# Patient Record
Sex: MALE | Race: WHITE | NOT HISPANIC OR LATINO
[De-identification: names, ages, dates, MRNs, and addresses within clinical notes are randomized per-mention and may not be internally consistent; named-entity substitution may affect disease eponyms.]

---

## 2020-08-10 PROBLEM — Z00.00 ENCOUNTER FOR PREVENTIVE HEALTH EXAMINATION: Status: ACTIVE | Noted: 2020-08-10

## 2020-08-13 ENCOUNTER — APPOINTMENT (OUTPATIENT)
Dept: ORTHOPEDIC SURGERY | Facility: CLINIC | Age: 50
End: 2020-08-13
Payer: COMMERCIAL

## 2020-08-13 VITALS
HEIGHT: 69 IN | TEMPERATURE: 97.6 F | OXYGEN SATURATION: 98 % | BODY MASS INDEX: 23.7 KG/M2 | HEART RATE: 70 BPM | WEIGHT: 160 LBS | DIASTOLIC BLOOD PRESSURE: 70 MMHG | SYSTOLIC BLOOD PRESSURE: 110 MMHG

## 2020-08-13 DIAGNOSIS — Z72.3 LACK OF PHYSICAL EXERCISE: ICD-10-CM

## 2020-08-13 PROCEDURE — 99203 OFFICE O/P NEW LOW 30 MIN: CPT

## 2020-08-14 NOTE — PHYSICAL EXAM
[de-identified] : Mri of the right knee shows an obliqjue tear in the posterior horn of the medial meniscus  there is an extensive subchondral bone marrow edema in the medial femoral condyle  [de-identified] : The patient is a well developed, well nourished male in no apparent distress. He is alert and oriented X 3 with a pleasant mood and appropriate affect.\par \par On physical examination of the right knee, his Rom is 0-120 degrees. The patient walks with an antalgic gait and stands in neutral alignment. There is 1+ effusion. No warmth or erythema is noted. The patella is non tender to palpation medially or laterally. There is no crepitus noted. The apprehension and grind tests are negative. The extensor mechanism is intact. There is medial joint line tenderness. The Sri sign is positive. The Lachman and pivot shift tests are negative. There is no varus or valgus laxity at 0 or 30 degrees. No posterolateral or anteromedial laxity is noted. No masses are palpable. No other soft tissue or bony tenderness is noted. Quadriceps weakness is noted. Neurovascular function is intact.

## 2020-08-14 NOTE — END OF VISIT
[FreeTextEntry3] : All medical record entries made by CHIP Wheat, acting as a scribe for this encounter under the direction of Herber Wilcox MD . I have reviewed the chart and agree that the record accurately reflects my personal performance of the history, physical exam, assessment and plan. I have also personally directed, reviewed, and agreed with the chart.

## 2020-08-14 NOTE — HISTORY OF PRESENT ILLNESS
[de-identified] : Darwin Avery is a 49 yo gentleman with right knee issues for the last month. He started doing high intensity home workouts with lots of lunges and squats. He developed progressive medial knee pain and swelling. He had difficulty with full weight bearing. He saw a local orthopedist and had an MRI. He was told he had a medial meniscus tear. He has not had an formal treatment as of yet. he denies any previous knee issues. \par \par \par

## 2020-08-14 NOTE — DISCUSSION/SUMMARY
[de-identified] : Mr Avery has a symptomatic medial insufficiency fracture. He will begin a course of Calcitonin nasal spray. he will be sent for a vitamin D level and an SMA 20. He will obtain a medial  brace for support. He will return in six weeks for reevaluation. He will call if any issues arise.

## 2020-09-22 ENCOUNTER — APPOINTMENT (OUTPATIENT)
Dept: ORTHOPEDIC SURGERY | Facility: CLINIC | Age: 50
End: 2020-09-22
Payer: COMMERCIAL

## 2020-09-22 VITALS
TEMPERATURE: 97.8 F | DIASTOLIC BLOOD PRESSURE: 80 MMHG | HEIGHT: 69 IN | SYSTOLIC BLOOD PRESSURE: 110 MMHG | WEIGHT: 160 LBS | BODY MASS INDEX: 23.7 KG/M2

## 2020-09-22 PROCEDURE — 99213 OFFICE O/P EST LOW 20 MIN: CPT

## 2020-09-23 NOTE — PHYSICAL EXAM
[de-identified] : The patient is a well developed, well nourished male in no apparent distress. He is alert and oriented X 3 with a pleasant mood and appropriate affect.\par \par On physical examination of the right knee, his Rom is 0-125degrees. The patient walks with an antalgic gait and stands in neutral alignment. There is  trace effusion. No warmth or erythema is noted. The patella is non tender to palpation medially or laterally. There is no crepitus noted. The apprehension and grind tests are negative. The extensor mechanism is intact. There is no  joint line tenderness. The Sri sign is negative.  The Lachman and pivot shift tests are negative. There is no varus or valgus laxity at 0 or 30 degrees. No posterolateral or anteromedial laxity is noted. No masses are palpable. No other soft tissue or bony tenderness is noted. Quadriceps weakness is noted. Neurovascular function is intact.

## 2020-09-23 NOTE — HISTORY OF PRESENT ILLNESS
[de-identified] : Darwin returns today for evaluation of his right knee. He reports significant improvement with Calcitonin and Calcium supplementation to treat his insufficiency fracture. He reports a decrease in pain and swelling. His ROm has improved. He is able to walk without pain. He denies any locking or buckling,

## 2020-09-23 NOTE — DISCUSSION/SUMMARY
[de-identified] : Darwin is responding well to the current treatment program for his medial insufficiency fracture. He will complete two more weeks of Calcitonin to finish the course. He will continue on with the Calcium and vitamin D supplementation. he will return in one month for reevaluation. He will call if any issues arise

## 2020-10-22 ENCOUNTER — APPOINTMENT (OUTPATIENT)
Dept: ORTHOPEDIC SURGERY | Facility: CLINIC | Age: 50
End: 2020-10-22
Payer: COMMERCIAL

## 2020-10-22 VITALS
TEMPERATURE: 97.6 F | WEIGHT: 160 LBS | SYSTOLIC BLOOD PRESSURE: 120 MMHG | DIASTOLIC BLOOD PRESSURE: 80 MMHG | HEIGHT: 69 IN | HEART RATE: 68 BPM | BODY MASS INDEX: 23.7 KG/M2 | OXYGEN SATURATION: 97 %

## 2020-10-22 DIAGNOSIS — M84.453A PATHOLOGICAL FRACTURE, UNSPECIFIED FEMUR, INITIAL ENCOUNTER FOR FRACTURE: ICD-10-CM

## 2020-10-22 PROCEDURE — 99072 ADDL SUPL MATRL&STAF TM PHE: CPT

## 2020-10-22 PROCEDURE — 99213 OFFICE O/P EST LOW 20 MIN: CPT

## 2020-10-27 PROBLEM — M84.453A INSUFFICIENCY FRACTURE OF MEDIAL FEMORAL CONDYLE: Status: ACTIVE | Noted: 2020-08-13

## 2020-10-27 NOTE — PHYSICAL EXAM
[de-identified] : The patient is a well developed, well nourished male in no apparent distress. He is alert and oriented X 3 with a pleasant mood and appropriate affect.\par \par On physical examination of the right knee, his Rom is 0-125degrees. The patient walks with an antalgic gait and stands in neutral alignment. There is  trace effusion. No warmth or erythema is noted. The patella is non tender to palpation medially or laterally. There is no crepitus noted. The apprehension and grind tests are negative. The extensor mechanism is intact. There is no  joint line tenderness. The Sri sign is negative.  The Lachman and pivot shift tests are negative. There is no varus or valgus laxity at 0 or 30 degrees. No posterolateral or anteromedial laxity is noted. No masses are palpable. No other soft tissue or bony tenderness is noted. Quadriceps weakness is noted. Neurovascular function is intact.

## 2020-10-27 NOTE — HISTORY OF PRESENT ILLNESS
[de-identified] : Darwin returns today for evaluation of his right knee. He has an insufficiency fracture. He reports improvement after completing his course of Calcitonin. He does have ongoing medial knee discomfort and stiffness. he has persistent swelling. he denies any locking or buckling.

## 2020-10-27 NOTE — DISCUSSION/SUMMARY
[de-identified] : Darwin has some persistent medial knee pain and ongoing swelling despite completing a course of Calcitonin. He will be sent for a repeat MRI to r.o bony collapse. We will call him with the results. he will continue to rest and ice his knee. All questions were answered. He will call if any issues arise.

## 2021-02-12 LAB
24R-OH-CALCIDIOL SERPL-MCNC: 43.3 PG/ML
ALBUMIN SERPL ELPH-MCNC: 4.8 G/DL
ALP BLD-CCNC: 103 U/L
ALT SERPL-CCNC: 14 U/L
ANION GAP SERPL CALC-SCNC: 15 MMOL/L
AST SERPL-CCNC: 19 U/L
BILIRUB SERPL-MCNC: 0.7 MG/DL
BUN SERPL-MCNC: 23 MG/DL
CALCIUM SERPL-MCNC: 10.1 MG/DL
CHLORIDE SERPL-SCNC: 102 MMOL/L
CO2 SERPL-SCNC: 26 MMOL/L
CREAT SERPL-MCNC: 0.83 MG/DL
GLUCOSE SERPL-MCNC: 100 MG/DL
POTASSIUM SERPL-SCNC: 4.7 MMOL/L
PROT SERPL-MCNC: 7.6 G/DL
SODIUM SERPL-SCNC: 143 MMOL/L

## 2021-08-05 ENCOUNTER — APPOINTMENT (OUTPATIENT)
Dept: UROLOGY | Facility: CLINIC | Age: 51
End: 2021-08-05
Payer: COMMERCIAL

## 2021-08-05 VITALS
WEIGHT: 182 LBS | DIASTOLIC BLOOD PRESSURE: 61 MMHG | BODY MASS INDEX: 26.96 KG/M2 | HEART RATE: 58 BPM | SYSTOLIC BLOOD PRESSURE: 113 MMHG | HEIGHT: 69 IN

## 2021-08-05 DIAGNOSIS — Z80.51 FAMILY HISTORY OF MALIGNANT NEOPLASM OF KIDNEY: ICD-10-CM

## 2021-08-05 DIAGNOSIS — Z78.9 OTHER SPECIFIED HEALTH STATUS: ICD-10-CM

## 2021-08-05 DIAGNOSIS — F17.200 NICOTINE DEPENDENCE, UNSPECIFIED, UNCOMPLICATED: ICD-10-CM

## 2021-08-05 PROCEDURE — 99204 OFFICE O/P NEW MOD 45 MIN: CPT

## 2021-08-05 RX ORDER — CALCITONIN SALMON 200 [IU]/1
200 SPRAY, METERED NASAL DAILY
Qty: 2 | Refills: 0 | Status: COMPLETED | COMMUNITY
Start: 2020-08-13 | End: 2021-08-05

## 2021-08-05 NOTE — ASSESSMENT
[FreeTextEntry1] : By physical exam and some additional history with respect to children my gestalt is I saw him for fertility issues felt that he had Klinefelter's and told him that at some point in time his testosterone levels will drop and he will need to get therapy where he will will have issues of hypogonadism.  The MRI in 2020 showed osteopenia his blood test reportedly showed low testosterone and the question is where to go from here\par \par I am going to send him for repeat hormone testing is even if he had 1 test that was abnormal we will going to need at least 2 to start him on therapy.  I am not going to repeat the chromosome analysis is whether writer will get my memory if not relevant to future therapy.\par \par Once we get the blood test he will get me the copy from his doctor 3 months ago we will have our tests and we will go over them.  He is planning on going to his physician to get the blood drawn I cautioned him to make sure they draw the tubes and use the lab numbers that we use as most doctors do not draw the subspecialty tenderness

## 2021-08-05 NOTE — PHYSICAL EXAM
[General Appearance - Well Developed] : well developed [General Appearance - Well Nourished] : well nourished [Normal Appearance] : normal appearance [Well Groomed] : well groomed [General Appearance - In No Acute Distress] : no acute distress [Heart Rate And Rhythm] : Heart rate and rhythm were normal [Edema] : no peripheral edema [Respiration, Rhythm And Depth] : normal respiratory rhythm and effort [Exaggerated Use Of Accessory Muscles For Inspiration] : no accessory muscle use [Auscultation Breath Sounds / Voice Sounds] : lungs were clear to auscultation bilaterally [Abdomen Soft] : soft [Abdomen Tenderness] : non-tender [Abdomen Hernia] : no hernia was discovered [Costovertebral Angle Tenderness] : no ~M costovertebral angle tenderness [Urethral Meatus] : meatus normal [Penis Abnormality] : normal circumcised penis [Normal Station and Gait] : the gait and station were normal for the patient's age [] : no rash [FreeTextEntry1] : Deep tendon reflexes are normal [Oriented To Time, Place, And Person] : oriented to person, place, and time [Affect] : the affect was normal [Mood] : the mood was normal [Not Anxious] : not anxious

## 2021-08-05 NOTE — HISTORY OF PRESENT ILLNESS
[FreeTextEntry1] : Darwin is a 51-year-old male born July 16, 1970 who tells me that I saw him 15 years ago was so told by the time he is age 50 he will need to come back as he will he will have some abnormalities that if not taken care of could kill him.  I do not have records from 15 years ago I do not recollect what he is talking about and I cannot recall ever telling someone that in 15 years unless they see me they will die.  There may have been some misunderstanding his native tongue is febrile though I am fairly fluent and it there could have been a contact gap.\par \par Regardless he tells me currently he has no trouble with urination he has normal desire in order to have relations with his wife he needs to take 40 mg of Viagra but with the Viagra on board he is doing well enough.  He has a normal libido if he sees a pretty woman in the street he is attracted though obviously being an Judaism Taoist he is not going to take advantage of his feelings.  He has no other urologic issues no trouble urinating nocturia dysuria no history of blood no kidney stones etc.\par \par He tells me that his PCP chaparro some blood about 3 months ago and showed a low testosterone it just of time until he could get in here he does not have the records but he knows to get them for me.

## 2021-08-05 NOTE — LETTER BODY
[Dear  ___] : Dear  [unfilled], [FreeTextEntry2] : Patient requested that no note be sent to his primary care doctor as some issues are simply his he does not want them shared with anyone the healthcare providers family children etc.

## 2021-08-05 NOTE — LETTER HEADER
[FreeTextEntry3] : Maria Dolores Francisco M.D.\par Director of Urology\par [facility]\par [address] [place] [address], [address]\[place] [address]

## 2021-08-12 ENCOUNTER — APPOINTMENT (OUTPATIENT)
Dept: UROLOGY | Facility: CLINIC | Age: 51
End: 2021-08-12
Payer: COMMERCIAL

## 2021-08-12 ENCOUNTER — LABORATORY RESULT (OUTPATIENT)
Age: 51
End: 2021-08-12

## 2021-08-12 VITALS
BODY MASS INDEX: 26.66 KG/M2 | TEMPERATURE: 98 F | WEIGHT: 180 LBS | HEART RATE: 58 BPM | HEIGHT: 69 IN | SYSTOLIC BLOOD PRESSURE: 122 MMHG | DIASTOLIC BLOOD PRESSURE: 63 MMHG

## 2021-08-12 DIAGNOSIS — N46.01 ORGANIC AZOOSPERMIA: ICD-10-CM

## 2021-08-12 PROCEDURE — 99214 OFFICE O/P EST MOD 30 MIN: CPT

## 2021-08-12 NOTE — PHYSICAL EXAM
[General Appearance - Well Developed] : well developed [General Appearance - Well Nourished] : well nourished [Normal Appearance] : normal appearance [Well Groomed] : well groomed [General Appearance - In No Acute Distress] : no acute distress [] : no respiratory distress [Respiration, Rhythm And Depth] : normal respiratory rhythm and effort [Exaggerated Use Of Accessory Muscles For Inspiration] : no accessory muscle use [Auscultation Breath Sounds / Voice Sounds] : lungs were clear to auscultation bilaterally [Oriented To Time, Place, And Person] : oriented to person, place, and time [Affect] : the affect was normal [Mood] : the mood was normal [Not Anxious] : not anxious [Normal Station and Gait] : the gait and station were normal for the patient's age

## 2021-08-12 NOTE — LETTER HEADER
[FreeTextEntry3] : Mraia Dolores Francisco M.D.\par Director of Urology\par [facility]\par [address] [place] [address], [address]\[place] [address]

## 2021-08-12 NOTE — ASSESSMENT
[FreeTextEntry1] : We had a long talk discussing the pathophysiology of the interaction between the pituitary and the testicles.\par \par 1, he would like to know if in retrospect he could have had children he does not recall ever having had genetic testing and would like to know\par 2, we went over the various forms of testosterone replacement including\par          Gel be it on the shoulders, the armpit or the thighs\par          Injections\par                 Testosterone cypionate into the muscle which is inexpensive but uncomfortable\par                  Xyosted into the fat of the abdominal wall which is once per week but expensive\par         Subcutaneous pellets AKA Testopel\par \par \par We discussed the risk benefits of all especially the risk of transference if he goes to [facility] we will start with gel.  He will get blood drawn in 3 weeks and see me in a month.\par \par As far as his erections we will see if they improve now that his testosterone hopefully normalizes if not we will treat that separately

## 2021-08-12 NOTE — HISTORY OF PRESENT ILLNESS
[FreeTextEntry1] : Darwin is a 51-year-old male born July 16, 1970 seen on August 5, 2021 for subspecialty opinion.  He reportedly has low testosterone has had this issue for years does not know if he ever had chromosome studies.  I sent him for set of labs telling him to be specifically accurate and what he gets he had labs done by his doctor they got most but not all of what we wanted and he is here for review.\par \par Please note he does not have any recollection of the test for osteopenia or osteoporosis though he did have an MRI in September 2020 that was consistent with 1 or the other [Currently Experiencing ___] :  [unfilled] [Erectile Dysfunction] : Erectile Dysfunction

## 2021-08-16 RX ORDER — TESTOSTERONE 16.2 MG/G
20.25 MG/ACT GEL TRANSDERMAL
Qty: 1 | Refills: 0 | Status: DISCONTINUED | COMMUNITY
Start: 2021-08-12 | End: 2021-08-16

## 2021-09-09 ENCOUNTER — OUTPATIENT (OUTPATIENT)
Dept: OUTPATIENT SERVICES | Facility: HOSPITAL | Age: 51
LOS: 1 days | Discharge: HOME | End: 2021-09-09

## 2021-09-09 ENCOUNTER — RESULT REVIEW (OUTPATIENT)
Age: 51
End: 2021-09-09

## 2021-09-09 LAB
ALBUMIN SERPL ELPH-MCNC: 4.8 G/DL
ALP BLD-CCNC: 95 U/L
ALT SERPL-CCNC: 26 U/L
AST SERPL-CCNC: 23 U/L
BASOPHILS # BLD AUTO: 0.04 K/UL
BASOPHILS NFR BLD AUTO: 0.5 %
BILIRUB DIRECT SERPL-MCNC: <0.2 MG/DL
BILIRUB INDIRECT SERPL-MCNC: >0.5 MG/DL
BILIRUB SERPL-MCNC: 0.7 MG/DL
EOSINOPHIL # BLD AUTO: 0.16 K/UL
EOSINOPHIL NFR BLD AUTO: 2 %
HCT VFR BLD CALC: 46.2 %
HGB BLD-MCNC: 15.1 G/DL
IMM GRANULOCYTES NFR BLD AUTO: 0.4 %
LYMPHOCYTES # BLD AUTO: 1.8 K/UL
LYMPHOCYTES NFR BLD AUTO: 22.1 %
MAN DIFF?: NORMAL
MCHC RBC-ENTMCNC: 31 PG
MCHC RBC-ENTMCNC: 32.7 G/DL
MCV RBC AUTO: 94.9 FL
MONOCYTES # BLD AUTO: 0.74 K/UL
MONOCYTES NFR BLD AUTO: 9.1 %
NEUTROPHILS # BLD AUTO: 5.36 K/UL
NEUTROPHILS NFR BLD AUTO: 65.9 %
PLATELET # BLD AUTO: 226 K/UL
PROT SERPL-MCNC: 7.2 G/DL
RBC # BLD: 4.87 M/UL
RBC # FLD: 13.2 %
WBC # FLD AUTO: 8.13 K/UL

## 2021-09-10 DIAGNOSIS — Z13.820 ENCOUNTER FOR SCREENING FOR OSTEOPOROSIS: ICD-10-CM

## 2021-09-10 DIAGNOSIS — M81.0 AGE-RELATED OSTEOPOROSIS WITHOUT CURRENT PATHOLOGICAL FRACTURE: ICD-10-CM

## 2021-09-10 LAB — ESTRADIOL SERPL-MCNC: 19 PG/ML

## 2021-09-13 LAB
TESTOST BND SERPL-MCNC: 3.6 PG/ML
TESTOSTERONE TOTAL S: 207 NG/DL

## 2021-09-14 LAB — SHBG SERPL-SCNC: 25.2 NMOL/L

## 2021-09-17 LAB
TESTOSTERONE FREE/WEAKLY BND: 34.6 NG/DL
TESTOSTERONE TOTAL S: 188 NG/DL
TESTOSTERONE, % FREE/WEAKLY BND: 18.4 %

## 2021-10-01 ENCOUNTER — APPOINTMENT (OUTPATIENT)
Dept: UROLOGY | Facility: CLINIC | Age: 51
End: 2021-10-01
Payer: COMMERCIAL

## 2021-10-01 VITALS
HEIGHT: 69 IN | WEIGHT: 185 LBS | SYSTOLIC BLOOD PRESSURE: 132 MMHG | DIASTOLIC BLOOD PRESSURE: 81 MMHG | HEART RATE: 61 BPM | BODY MASS INDEX: 27.4 KG/M2

## 2021-10-01 PROCEDURE — 99214 OFFICE O/P EST MOD 30 MIN: CPT

## 2021-10-01 RX ORDER — TESTOSTERONE 50 MG/5G
50 MG/5GM GEL TRANSDERMAL
Qty: 30 | Refills: 0 | Status: COMPLETED | COMMUNITY
Start: 2021-08-16 | End: 2021-10-01

## 2021-10-01 NOTE — PHYSICAL EXAM
[General Appearance - Well Developed] : well developed [General Appearance - Well Nourished] : well nourished [Normal Appearance] : normal appearance [Well Groomed] : well groomed [General Appearance - In No Acute Distress] : no acute distress [Abdomen Soft] : soft [Abdomen Tenderness] : non-tender [Costovertebral Angle Tenderness] : no ~M costovertebral angle tenderness [Edema] : no peripheral edema [Heart Rate And Rhythm] : Heart rate and rhythm were normal [] : no respiratory distress [Respiration, Rhythm And Depth] : normal respiratory rhythm and effort [Exaggerated Use Of Accessory Muscles For Inspiration] : no accessory muscle use [Auscultation Breath Sounds / Voice Sounds] : lungs were clear to auscultation bilaterally [Oriented To Time, Place, And Person] : oriented to person, place, and time [Affect] : the affect was normal [Mood] : the mood was normal [Not Anxious] : not anxious [Normal Station and Gait] : the gait and station were normal for the patient's age

## 2021-10-01 NOTE — HISTORY OF PRESENT ILLNESS
[Currently Experiencing ___] :  [unfilled] [Erectile Dysfunction] : Erectile Dysfunction [FreeTextEntry1] : Darwin is a 51-year-old male last seen on August 12, 2021.  His testosterone levels were extremely low we sent him for chromosome analysis as it had never been done started him on AndroGel 1.62% 2 pumps per day he had repeat bloods drawn on September 9 and is here for follow-up review.  He tells me as soon as he started the testosterone the erections came back.  It works but not reliably without Viagra, now with Viagra he thinks it is 2 of the 20 mg tablets he feels like he is a teenager again.  He questions if he needs a higher dose of testosterone.\par \par Given his prior history of osteoporosis and the severe low testosterone level we also sent him for a bone scan.

## 2021-10-01 NOTE — ASSESSMENT
[FreeTextEntry1] : There are several issues here.  #1 though the testosterone is up it is still suboptimal with the bioavailable just below normal.  It is almost 7 times what it was without the medication but he needs more.\par \par We discussed the various options including doubling up the gel, switching to other modalities such as Xyosted or testosterone cypionate and eventually Testopel if he so desires.  For now as it is going to take time to get the Xyosted authorized and just can give him a double dose of AndroGel.  If the staff can get Xyosted authorized we will switch to the Xyosted he will get blood in about 6 weeks and see me in 8.  As we raise the testosterone level he may no longer need the Viagra but if he does as long as it is working that is good enough\par \par The next issue is the osteopenia.  Still a very high fracture risk is a young active man and I am recommending he contact his primary care doctor and get to an endocrinologist soon as possible.  Though over time the testosterone replacement may help his bones at this point he will probably need a bisphosphonate to help control it as well.

## 2021-10-01 NOTE — LETTER HEADER
[FreeTextEntry3] : Maria Dolores Francisco M.D.\par Director of Urology\par [facility]\par [address] [place] [address], [address]\[place] [address]

## 2021-10-18 NOTE — REVIEW OF SYSTEMS
[Joint Pain] : no joint pain [Joint Stiffness] : joint stiffness [Joint Swelling] : joint swelling [Negative] : Heme/Lymph You can access the FollowMyHealth Patient Portal offered by [facility] by registering at the following website: [email or web address]. By joining [employer]’s FollowMyHealth portal, you will also be able to view your health information using other applications (apps) compatible with our system.

## 2021-10-25 LAB
ALBUMIN SERPL ELPH-MCNC: 4.8 G/DL
ALP BLD-CCNC: 93 U/L
ALT SERPL-CCNC: 29 U/L
AST SERPL-CCNC: 28 U/L
BASOPHILS # BLD AUTO: 0.03 K/UL
BASOPHILS NFR BLD AUTO: 0.4 %
BILIRUB DIRECT SERPL-MCNC: 0.2 MG/DL
BILIRUB INDIRECT SERPL-MCNC: 0.8 MG/DL
BILIRUB SERPL-MCNC: 1 MG/DL
EOSINOPHIL # BLD AUTO: 0.17 K/UL
EOSINOPHIL NFR BLD AUTO: 2.5 %
ESTRADIOL SERPL-MCNC: 24 PG/ML
HCT VFR BLD CALC: 49 %
HGB BLD-MCNC: 15.7 G/DL
IMM GRANULOCYTES NFR BLD AUTO: 0.3 %
LYMPHOCYTES # BLD AUTO: 1.45 K/UL
LYMPHOCYTES NFR BLD AUTO: 21.2 %
MAN DIFF?: NORMAL
MCHC RBC-ENTMCNC: 30.5 PG
MCHC RBC-ENTMCNC: 32 G/DL
MCV RBC AUTO: 95.3 FL
MONOCYTES # BLD AUTO: 0.69 K/UL
MONOCYTES NFR BLD AUTO: 10.1 %
NEUTROPHILS # BLD AUTO: 4.49 K/UL
NEUTROPHILS NFR BLD AUTO: 65.5 %
PLATELET # BLD AUTO: 250 K/UL
PROT SERPL-MCNC: 7.3 G/DL
RBC # BLD: 5.14 M/UL
RBC # FLD: 13 %
TESTOST BND SERPL-MCNC: 7.5 PG/ML
TESTOSTERONE TOTAL S: 417 NG/DL
WBC # FLD AUTO: 6.85 K/UL

## 2021-10-26 LAB
SHBG SERPL-SCNC: 25.9 NMOL/L
TESTOSTERONE FREE/WEAKLY BND: 99.5 NG/DL
TESTOSTERONE TOTAL S: 510 NG/DL
TESTOSTERONE, % FREE/WEAKLY BND: 19.5 %

## 2021-10-29 ENCOUNTER — APPOINTMENT (OUTPATIENT)
Dept: UROLOGY | Facility: CLINIC | Age: 51
End: 2021-10-29
Payer: COMMERCIAL

## 2021-10-29 VITALS
WEIGHT: 180 LBS | HEART RATE: 60 BPM | HEIGHT: 69 IN | SYSTOLIC BLOOD PRESSURE: 131 MMHG | DIASTOLIC BLOOD PRESSURE: 77 MMHG | BODY MASS INDEX: 26.66 KG/M2

## 2021-10-29 PROCEDURE — 99214 OFFICE O/P EST MOD 30 MIN: CPT

## 2021-10-29 RX ORDER — SILDENAFIL 100 MG/1
100 TABLET, FILM COATED ORAL
Qty: 10 | Refills: 1 | Status: ACTIVE | OUTPATIENT
Start: 2021-10-29

## 2021-10-29 RX ORDER — TESTOSTERONE 20.25 MG/1.25G
20.25 MG/1.25GM GEL TOPICAL
Qty: 1 | Refills: 0 | Status: COMPLETED | COMMUNITY
Start: 2021-09-14 | End: 2021-10-29

## 2021-10-29 NOTE — HISTORY OF PRESENT ILLNESS
[FreeTextEntry1] : Darwin is a 51-year-old male born July 16, 1970.  We last saw him October 1, 2021 he has low testosterone secondary to Klinefelter's and we now have him on AndroGel 1.62% with 4 pumps per day.  He tells me he is feeling fine except that though he can now get rigid enough to attempt sex as soon as he gets close to the vagina the penis drops down and he can have satisfactory sex.  He wants to know what else we can do.\par \par Please note he tried Viagra in the past however at the time he had no testosterone on board and the question is will work better now that he has a normal testosterone level.\par \par Additionally he finds putting the gel on every morning and wait for it to dry is really quite irritating.  He wants to know if he would be a good candidate for Testopel.\par \par \par \par As far as his osteopenia his PCP referred him to Dr. Jim Gross an excellent endocrinologist who deals with osteopenia was based in [place]. [Erectile Dysfunction] : Erectile Dysfunction

## 2021-10-29 NOTE — LETTER HEADER
[FreeTextEntry3] : Maria Dolores Francisco M.D.\par Director of Urology\par [facility]\par [address] [place] [address], [address]\[place] [address]

## 2021-10-29 NOTE — PHYSICAL EXAM
[General Appearance - Well Developed] : well developed [General Appearance - Well Nourished] : well nourished [Normal Appearance] : normal appearance [Well Groomed] : well groomed [General Appearance - In No Acute Distress] : no acute distress [Abdomen Soft] : soft [Abdomen Tenderness] : non-tender [Costovertebral Angle Tenderness] : no ~M costovertebral angle tenderness [Heart Rate And Rhythm] : Heart rate and rhythm were normal [Edema] : no peripheral edema [] : no respiratory distress [Respiration, Rhythm And Depth] : normal respiratory rhythm and effort [Exaggerated Use Of Accessory Muscles For Inspiration] : no accessory muscle use [Auscultation Breath Sounds / Voice Sounds] : lungs were clear to auscultation bilaterally [Oriented To Time, Place, And Person] : oriented to person, place, and time [Affect] : the affect was normal [Not Anxious] : not anxious [Mood] : the mood was normal [Normal Station and Gait] : the gait and station were normal for the patient's age [No Focal Deficits] : no focal deficits

## 2021-10-29 NOTE — ASSESSMENT
[FreeTextEntry1] : With respect to the testosterone he is doing well on the gels but is quite irritating.  We will see if Testopel can be approved and if not he will have to decide if he wants to self-pay.  For now I will renew the AndroGel and wait authorization on the Testopel\par \par As far as his erectile dysfunction I reviewed the difference between cyclic GMP and cyclic AMP why the PDE 5 inhibitors working some but not at all, wife some work in some and some work and others including the time to onset including with and without food if it Cialis versus Viagra and the duration of activity with Viagra going to work quicker but getting out of her system quicker Cialis taking a little bit to get to work see you take it with normal relief time but you are potentiation window will be higher.  We also discussed the potential more serious side effects of nonischemic arterial optic neuropathy and of the ringing in the years which can be permanent\par \par He has taken Viagra at 100 mg dose without side effects in the past so we will try it again.  If that does not work we will try Cialis and if that does not work we discussed a Doppler study to see if injections which works on the cyclic AMP system as well as by blocking the nerves of detumescence we will use Trimix, is for him.

## 2021-11-08 ENCOUNTER — APPOINTMENT (OUTPATIENT)
Dept: UROLOGY | Facility: CLINIC | Age: 51
End: 2021-11-08
Payer: COMMERCIAL

## 2021-11-08 VITALS
WEIGHT: 187 LBS | DIASTOLIC BLOOD PRESSURE: 80 MMHG | HEART RATE: 56 BPM | BODY MASS INDEX: 27.7 KG/M2 | HEIGHT: 69 IN | SYSTOLIC BLOOD PRESSURE: 134 MMHG

## 2021-11-08 PROCEDURE — 99214 OFFICE O/P EST MOD 30 MIN: CPT

## 2021-11-08 NOTE — LETTER HEADER
[FreeTextEntry3] : Maria Dolores Francisco M.D.\par Director of Urology\par [facility]\par [address] [place] [address], [address]\[place] [address]

## 2021-11-08 NOTE — ASSESSMENT
[FreeTextEntry1] : try to get testopel from pharmacy to see if it saves deductible \par \par if not we will administer late Testopel in January so that his deductible will be carried forward for 2022\par \par in interim continue gel, we gave him a script now as he is going to [place]

## 2022-01-14 ENCOUNTER — APPOINTMENT (OUTPATIENT)
Dept: UROLOGY | Facility: CLINIC | Age: 52
End: 2022-01-14
Payer: COMMERCIAL

## 2022-01-14 VITALS
TEMPERATURE: 97.2 F | HEIGHT: 69 IN | DIASTOLIC BLOOD PRESSURE: 79 MMHG | HEART RATE: 59 BPM | WEIGHT: 185 LBS | SYSTOLIC BLOOD PRESSURE: 126 MMHG | BODY MASS INDEX: 27.4 KG/M2

## 2022-01-14 PROCEDURE — S0189: CPT

## 2022-01-14 PROCEDURE — 11980 IMPLANT HORMONE PELLET(S): CPT

## 2022-01-14 PROCEDURE — 99214 OFFICE O/P EST MOD 30 MIN: CPT | Mod: 25

## 2022-01-14 NOTE — HISTORY OF PRESENT ILLNESS
[FreeTextEntry1] : Darwin is a 51-year-old male who has been on testosterone replacement therapy secondary to chromosomal abnormalities with gel which he does not like.  We have discussed alternative methods he is not willing to do self injection neither testosterone cypionate IM or with Xyosted.  We have discussed Testopel but not sure if his insurance will cover he decided he wanted to do it though he contacted his insurance and they said they will and he understands that if they do not pay he will get billed.\par \par He has not been taking any aspirin for a while he did use the gel today though I would have told him not to not going to hold off on the pellets as it will get absorbed immediately and by tomorrow he should be fine.\par \par As far as his erections sildenafil is working but it can soften in the middle and then he has to regain if he wants to know if there is anything else he can try

## 2022-01-14 NOTE — ASSESSMENT
[FreeTextEntry1] : We are going to give him 6 pellets get peak bloods in 2 weeks trough bloods in 12 and have him come back in 13.  He will call before he comes in and if he cannot get through he will email me to make sure that one we have the bloods and 2, he is due for the next dose.  Depending on the levels he may decide next time to give him more pellets or less, and to increase or decrease the interval.\par \par The consent was reviewed he does not read English but I went over it with him and he declined getting a copy in Malay and will e mail him a copy of the consent and he will have it translated with a program he has\par \par We discussed the use of tadalafil he understands it has different side effects and in some people tadalafil works better than sildenafil and some sildenafil works better than tadalafil and he should not take that with him 24 hours of 1 another\par \par

## 2022-01-14 NOTE — LETTER HEADER
[FreeTextEntry3] : Maria Dolores Francisco M.D.\par Director of Urology\par [facility]\par [address] [place] [address], [address]\[place] [address]

## 2022-02-24 RX ORDER — TESTOSTERONE 75 MG/1
75 PELLET SUBCUTANEOUS
Qty: 10 | Refills: 0 | Status: ACTIVE | OUTPATIENT
Start: 2021-11-08

## 2022-03-28 LAB
ALBUMIN SERPL ELPH-MCNC: 4.6 G/DL
ALP BLD-CCNC: 88 U/L
ALT SERPL-CCNC: 32 U/L
AST SERPL-CCNC: 26 U/L
BASOPHILS # BLD AUTO: 0.03 K/UL
BASOPHILS NFR BLD AUTO: 0.4 %
BILIRUB DIRECT SERPL-MCNC: 0.3 MG/DL
BILIRUB INDIRECT SERPL-MCNC: 1.1 MG/DL
BILIRUB SERPL-MCNC: 1.4 MG/DL
EOSINOPHIL # BLD AUTO: 0.21 K/UL
EOSINOPHIL NFR BLD AUTO: 2.9 %
ESTRADIOL SERPL-MCNC: 30 PG/ML
HCT VFR BLD CALC: 51.6 %
HGB BLD-MCNC: 16.6 G/DL
IMM GRANULOCYTES NFR BLD AUTO: 0.3 %
LYMPHOCYTES # BLD AUTO: 1.4 K/UL
LYMPHOCYTES NFR BLD AUTO: 19.6 %
MAN DIFF?: NORMAL
MCHC RBC-ENTMCNC: 30.4 PG
MCHC RBC-ENTMCNC: 32.2 G/DL
MCV RBC AUTO: 94.5 FL
MONOCYTES # BLD AUTO: 0.61 K/UL
MONOCYTES NFR BLD AUTO: 8.5 %
NEUTROPHILS # BLD AUTO: 4.89 K/UL
NEUTROPHILS NFR BLD AUTO: 68.3 %
PLATELET # BLD AUTO: 235 K/UL
PROT SERPL-MCNC: 7.4 G/DL
RBC # BLD: 5.46 M/UL
RBC # FLD: 13.3 %
WBC # FLD AUTO: 7.16 K/UL

## 2022-03-30 LAB — SHBG SERPL-SCNC: 28 NMOL/L

## 2022-03-31 LAB
TESTOSTERONE FREE/WEAKLY BND: 72.2 NG/DL
TESTOSTERONE TOTAL S: 410 NG/DL
TESTOSTERONE, % FREE/WEAKLY BND: 17.6 %

## 2022-04-15 ENCOUNTER — APPOINTMENT (OUTPATIENT)
Dept: UROLOGY | Facility: CLINIC | Age: 52
End: 2022-04-15

## 2022-05-02 LAB
ESTRADIOL SERPL-MCNC: 14 PG/ML
TESTOST FREE SERPL-MCNC: 2.4 PG/ML
TESTOST SERPL-MCNC: 165 NG/DL

## 2022-05-03 LAB
TESTOSTERONE FREE/WEAKLY BND: 38.8 NG/DL
TESTOSTERONE TOTAL S: 165 NG/DL
TESTOSTERONE, % FREE/WEAKLY BND: 23.5 %

## 2022-05-05 LAB — SHBG SERPL-SCNC: 23.3 NMOL/L

## 2022-05-13 ENCOUNTER — APPOINTMENT (OUTPATIENT)
Dept: UROLOGY | Facility: CLINIC | Age: 52
End: 2022-05-13
Payer: COMMERCIAL

## 2022-05-13 VITALS
BODY MASS INDEX: 26.96 KG/M2 | WEIGHT: 182 LBS | HEART RATE: 53 BPM | SYSTOLIC BLOOD PRESSURE: 120 MMHG | DIASTOLIC BLOOD PRESSURE: 69 MMHG | HEIGHT: 69 IN

## 2022-05-13 PROCEDURE — 99214 OFFICE O/P EST MOD 30 MIN: CPT | Mod: 25

## 2022-05-13 PROCEDURE — 11980 IMPLANT HORMONE PELLET(S): CPT

## 2022-05-13 PROCEDURE — S0189: CPT

## 2022-05-13 NOTE — HISTORY OF PRESENT ILLNESS
[Erectile Dysfunction] : Erectile Dysfunction [FreeTextEntry1] : Darwin is a 51-year-old male born July 16, 1970 last seen January 14, 2022.  He has low testosterone secondary to his Klinefelter's we have him on Testopel he got 6 pellets in his right side on January 14 complicated by hematoma which bother him for up to a month.  However his erections normalized he did not need sildenafil when he was having relations, blood test were done in March that still have about a good blood level we had repeat blood test done, delayed because of the Passover holiday, in April he is here for review.  He still feeling okay but he could tell that is beginning to wait\par \par As far as his osteopenia he saw our Dr. Jim Gross at [facility] who acknowledges the osteopenia but said he does not want to give him prescription medication but put him on vitamins.  He does not remember the name and he is aware that we are replenishing his testosterone I am hoping this will help his bone density\par \par As far as his erections as above when he has a good testosterone level he is really working fine on his own as the levels go down he is using sildenafil at the 100 mg dose without any issues

## 2022-05-13 NOTE — LETTER HEADER
[FreeTextEntry3] : Maria Dolores Francisco M.D.\par Director Emeritus of Urology\par [facility]\par [address] [place] [address], [address] [address]

## 2022-05-13 NOTE — ASSESSMENT
[FreeTextEntry1] : He is probably about 2 weeks late and 3 months was 2 weeks early so we will get blood in 3 and see him in 3-1/2 months.\par I will have him hold pressure for 10 minutes this time not the 5 we did the last and hopefully this will help decrease the chance of the ecchymosis.\par \par He wondered why at the beginning he did not need the sildenafil and now he does and I explained that as his testosterone level windmills his function will dwindle with it and that is why we will try and get him in a little sooner so we get him in before he bottoms out.\par \par We have a hemoglobin from a trough I do not have 1 from a peak so he will get blood test in 2 weeks to check his liver function and hemoglobin when his testosterone was at its peak he will then get blood again in 3 months and see me in 3-1/2 probably for the next dose that is something we will decide at the time

## 2022-05-13 NOTE — PHYSICAL EXAM
[General Appearance - Well Developed] : well developed [General Appearance - Well Nourished] : well nourished [Normal Appearance] : normal appearance [Well Groomed] : well groomed [General Appearance - In No Acute Distress] : no acute distress [Abdomen Soft] : soft [Abdomen Tenderness] : non-tender [Costovertebral Angle Tenderness] : no ~M costovertebral angle tenderness [FreeTextEntry1] : He had a picture of the bruising from the previous Testopel insertion on the right that area is fully healed [Edema] : no peripheral edema [] : no respiratory distress [Respiration, Rhythm And Depth] : normal respiratory rhythm and effort [Exaggerated Use Of Accessory Muscles For Inspiration] : no accessory muscle use [Oriented To Time, Place, And Person] : oriented to person, place, and time [Affect] : the affect was normal [Mood] : the mood was normal [Not Anxious] : not anxious [Normal Station and Gait] : the gait and station were normal for the patient's age

## 2022-08-22 ENCOUNTER — LABORATORY RESULT (OUTPATIENT)
Age: 52
End: 2022-08-22

## 2022-08-26 ENCOUNTER — APPOINTMENT (OUTPATIENT)
Dept: UROLOGY | Facility: CLINIC | Age: 52
End: 2022-08-26

## 2022-08-26 VITALS
SYSTOLIC BLOOD PRESSURE: 125 MMHG | DIASTOLIC BLOOD PRESSURE: 77 MMHG | WEIGHT: 180 LBS | TEMPERATURE: 97.9 F | BODY MASS INDEX: 26.66 KG/M2 | HEART RATE: 57 BPM | HEIGHT: 69 IN

## 2022-08-26 PROCEDURE — 99214 OFFICE O/P EST MOD 30 MIN: CPT | Mod: 25

## 2022-08-26 PROCEDURE — S0189: CPT

## 2022-08-26 PROCEDURE — 11980 IMPLANT HORMONE PELLET(S): CPT

## 2022-08-26 NOTE — LETTER BODY
[Dear  ___] : Dear  [unfilled], [Sincerely,] : Sincerely, [FreeTextEntry2] : Patient requested that no note be sent to his primary care doctor as some issues are simply his he does not want them shared with anyone the healthcare providers family children etc.

## 2022-08-26 NOTE — LETTER HEADER
[FreeTextEntry3] : Maria Dolores Francisco M.D.\par Director Emeritus of Urology\par [facility]\par [address] [place] [address], [address]\[place] [address]

## 2022-08-26 NOTE — PHYSICAL EXAM
[General Appearance - Well Developed] : well developed [General Appearance - Well Nourished] : well nourished [Normal Appearance] : normal appearance [Well Groomed] : well groomed [General Appearance - In No Acute Distress] : no acute distress [Abdomen Soft] : soft [Abdomen Tenderness] : non-tender [Costovertebral Angle Tenderness] : no ~M costovertebral angle tenderness [Heart Rate And Rhythm] : Heart rate and rhythm were normal [Edema] : no peripheral edema [] : no respiratory distress [Respiration, Rhythm And Depth] : normal respiratory rhythm and effort [Exaggerated Use Of Accessory Muscles For Inspiration] : no accessory muscle use [Oriented To Time, Place, And Person] : oriented to person, place, and time [Affect] : the affect was normal [Mood] : the mood was normal [Not Anxious] : not anxious [Normal Station and Gait] : the gait and station were normal for the patient's age [No Focal Deficits] : no focal deficits

## 2022-08-26 NOTE — HISTORY OF PRESENT ILLNESS
[Erectile Dysfunction] : Erectile Dysfunction [FreeTextEntry1] : Darwin is a 52-year-old male born July 16, 1970 last seen on May 13, 2022.  At that date he got 6 Testopel and we had him hold for 10 minutes of the time before he had a small hematoma which bother him for up to a month.  When his hormones are normal his erections are good without sildenafil but will relate there is been some tension in his marriage and sometimes even with sildenafil does not work.  Towards the beginning of his Testopel interval he works better as he gets towards the end that is wearing off he does not work as well.  He has osteopenia for which she is seeing an endocrinologist at [facility] who is giving him vitamins and waiting to see if that testosterone will help his bone density.  He got blood drawn on August 22, 2022 and is here to discuss his intermittent erectile function and to see whether he needs his next dose of medication.

## 2022-08-26 NOTE — ASSESSMENT
[FreeTextEntry1] : Plan his numbers are low related to a bioavailable but it is time we will give him 6 pellets at this time into the right side he will hold the 10 minutes like he did the last time which worked out well and we will see him again in 3-1/2 months\par \par As far as his erections #1 if it works on Monday and not on Tuesday its not the penis because he has the same penis Tuesday that he did Monday.  Life is stressful and when he is more stressed it will not work as well.  There are options for example intracavernosal injection which will bypass the brain completely he is not willing to stick his penis with a needle just to have sex and what he will do was just trying meditation relaxation techniques and if it does not work it does not work.  As well he knows that towards the beginning of the Testopel cycle it is working much better if he finds it is beginning to work less and less we need to get the bloods drawn sooner and if he is close enough bring him in for his next dose earlier.

## 2022-09-01 ENCOUNTER — APPOINTMENT (OUTPATIENT)
Dept: UROLOGY | Facility: CLINIC | Age: 52
End: 2022-09-01

## 2022-09-01 VITALS
HEART RATE: 59 BPM | DIASTOLIC BLOOD PRESSURE: 86 MMHG | HEIGHT: 69 IN | WEIGHT: 180 LBS | SYSTOLIC BLOOD PRESSURE: 133 MMHG | RESPIRATION RATE: 16 BRPM | BODY MASS INDEX: 26.66 KG/M2 | TEMPERATURE: 97.7 F

## 2022-09-01 PROCEDURE — 99213 OFFICE O/P EST LOW 20 MIN: CPT

## 2022-09-01 NOTE — ASSESSMENT
[FreeTextEntry1] : He has a small area of ecchymosis in the bottom of his right buttock.  However, there is no hematoma and the puncture site for the trocar has no signs of discharge or infection.  The whole area is nontender.\par \par Well we have decided is the next time he gets the pellet insertion next time instead of 5 he will hold compression for 15 minutes between compression and lying on the pellet insertion site

## 2022-09-01 NOTE — HISTORY OF PRESENT ILLNESS
[FreeTextEntry1] : Darwin is a 52-year-old male born July 16, 1970 who had Testopel pellets inserted August 26, 2022.  He took off the dressing yesterday and noticed a large ecchymotic area he was afraid to take off the Steri-Strips and was told to come in today.  Not having any pain, fever, no trouble sitting walking traveling defecating etc. he was just concerned about the discoloration [Currently Experiencing ___] :  [unfilled]

## 2022-09-01 NOTE — LETTER HEADER
[FreeTextEntry3] : Maria Dolores Francisco M.D.\par Director Emeritus of Urology\par [facility]\par [address] [place] [address], [address]\[place] [address]

## 2022-09-01 NOTE — PHYSICAL EXAM
[General Appearance - Well Developed] : well developed [General Appearance - Well Nourished] : well nourished [Normal Appearance] : normal appearance [Well Groomed] : well groomed [General Appearance - In No Acute Distress] : no acute distress [FreeTextEntry1] : Ecchymotic area about 3 cm cephalad caudad and about 8 cm lateral medial [] : no respiratory distress [Respiration, Rhythm And Depth] : normal respiratory rhythm and effort [Exaggerated Use Of Accessory Muscles For Inspiration] : no accessory muscle use [Oriented To Time, Place, And Person] : oriented to person, place, and time [Affect] : the affect was normal [Mood] : the mood was normal [Not Anxious] : not anxious [Normal Station and Gait] : the gait and station were normal for the patient's age

## 2022-11-23 LAB
BASOPHILS # BLD AUTO: 0.04 K/UL
BASOPHILS NFR BLD AUTO: 0.5 %
EOSINOPHIL # BLD AUTO: 0.2 K/UL
EOSINOPHIL NFR BLD AUTO: 2.7 %
HCT VFR BLD CALC: 49 %
HGB BLD-MCNC: 17 G/DL
IMM GRANULOCYTES NFR BLD AUTO: 0.4 %
LYMPHOCYTES # BLD AUTO: 1.48 K/UL
LYMPHOCYTES NFR BLD AUTO: 19.7 %
MAN DIFF?: NORMAL
MCHC RBC-ENTMCNC: 32 PG
MCHC RBC-ENTMCNC: 34.7 G/DL
MCV RBC AUTO: 92.1 FL
MONOCYTES # BLD AUTO: 0.61 K/UL
MONOCYTES NFR BLD AUTO: 8.1 %
NEUTROPHILS # BLD AUTO: 5.17 K/UL
NEUTROPHILS NFR BLD AUTO: 68.6 %
PLATELET # BLD AUTO: 239 K/UL
RBC # BLD: 5.32 M/UL
RBC # FLD: 12.7 %
WBC # FLD AUTO: 7.53 K/UL

## 2022-11-28 LAB
ESTRADIOL SERPL-MCNC: 16 PG/ML
PSA FREE FLD-MCNC: 29 %
PSA FREE SERPL-MCNC: 0.41 NG/ML
PSA SERPL-MCNC: 1.41 NG/ML
SHBG SERPL-SCNC: 27.8 NMOL/L

## 2022-11-29 LAB
TESTOST FREE SERPL-MCNC: 5.3 PG/ML
TESTOST SERPL-MCNC: 206 NG/DL

## 2022-11-30 LAB
TESTOSTERONE FREE/WEAKLY BND: 62.2 NG/DL
TESTOSTERONE TOTAL S: 305 NG/DL
TESTOSTERONE, % FREE/WEAKLY BND: 20.4 %

## 2022-12-16 ENCOUNTER — APPOINTMENT (OUTPATIENT)
Dept: UROLOGY | Facility: CLINIC | Age: 52
End: 2022-12-16

## 2022-12-16 VITALS
TEMPERATURE: 98 F | RESPIRATION RATE: 17 BRPM | HEIGHT: 69 IN | BODY MASS INDEX: 26.66 KG/M2 | WEIGHT: 180 LBS | HEART RATE: 57 BPM | DIASTOLIC BLOOD PRESSURE: 78 MMHG | SYSTOLIC BLOOD PRESSURE: 117 MMHG | OXYGEN SATURATION: 99 %

## 2022-12-16 PROCEDURE — 99214 OFFICE O/P EST MOD 30 MIN: CPT | Mod: 25

## 2022-12-16 PROCEDURE — 11980 IMPLANT HORMONE PELLET(S): CPT

## 2022-12-16 PROCEDURE — S0189: CPT

## 2022-12-16 NOTE — HISTORY OF PRESENT ILLNESS
[Currently Experiencing ___] :  [unfilled] [FreeTextEntry1] : Darwin is a 52-year-old male born July 16, 1970 last seen August 26, 2022 secondary to hematoma at the insertion site from his last Testopel.  His last set of labs were on November 23, 2022 which showed his levels to be low enough that he was ready for his next dose.  He came here today so we could review this and if he desires to get in the next dose.\par \par With Testopel on board sildenafil is working he is not having any trouble with it sometimes he uses tadalafil both at the maximum dose\par \par He has osteopenia as defined on the DEXA scan September 9 we have not retested this in a while and we probably should as if its not getting better now that he has been on Testosterone he may need additional therapy

## 2022-12-16 NOTE — ASSESSMENT
[FreeTextEntry1] : His numbers are not at the very bottom but he is down enough and I would like to get him his next dose beforehand.  Last time he got it on the right side this time we will given on the left.  I do not know why he had the hematoma he is aware that we do not have the epinephrine lidocaine because of supply chain problems is what we will do is have him compress for 5 minutes and then lying on it for another 5 hopefully that will work

## 2022-12-16 NOTE — LETTER HEADER
[FreeTextEntry3] : Maria Dolores Francisco M.D.\par Director Emeritus of Urology\par [facility]\par [address] [place] [address], [address]\[place] [address]

## 2023-02-28 ENCOUNTER — LABORATORY RESULT (OUTPATIENT)
Age: 53
End: 2023-02-28

## 2023-03-08 ENCOUNTER — APPOINTMENT (OUTPATIENT)
Dept: UROLOGY | Facility: CLINIC | Age: 53
End: 2023-03-08

## 2023-04-17 ENCOUNTER — APPOINTMENT (OUTPATIENT)
Dept: UROLOGY | Facility: CLINIC | Age: 53
End: 2023-04-17
Payer: COMMERCIAL

## 2023-04-17 VITALS
RESPIRATION RATE: 18 BRPM | TEMPERATURE: 97.2 F | OXYGEN SATURATION: 98 % | BODY MASS INDEX: 27.55 KG/M2 | HEART RATE: 61 BPM | HEIGHT: 69 IN | WEIGHT: 186 LBS | SYSTOLIC BLOOD PRESSURE: 127 MMHG | DIASTOLIC BLOOD PRESSURE: 68 MMHG

## 2023-04-17 LAB
BASOPHILS # BLD AUTO: 0.03 K/UL
BASOPHILS NFR BLD AUTO: 0.4 %
EOSINOPHIL # BLD AUTO: 0.14 K/UL
EOSINOPHIL NFR BLD AUTO: 1.9 %
HCT VFR BLD CALC: 47.9 %
HGB BLD-MCNC: 15.7 G/DL
IMM GRANULOCYTES NFR BLD AUTO: 0.4 %
LYMPHOCYTES # BLD AUTO: 1.37 K/UL
LYMPHOCYTES NFR BLD AUTO: 18.3 %
MAN DIFF?: NORMAL
MCHC RBC-ENTMCNC: 31 PG
MCHC RBC-ENTMCNC: 32.8 G/DL
MCV RBC AUTO: 94.5 FL
MONOCYTES # BLD AUTO: 0.75 K/UL
MONOCYTES NFR BLD AUTO: 10 %
NEUTROPHILS # BLD AUTO: 5.16 K/UL
NEUTROPHILS NFR BLD AUTO: 69 %
PLATELET # BLD AUTO: 238 K/UL
RBC # BLD: 5.07 M/UL
RBC # FLD: 12.7 %
WBC # FLD AUTO: 7.48 K/UL

## 2023-04-17 PROCEDURE — 99214 OFFICE O/P EST MOD 30 MIN: CPT

## 2023-04-17 RX ORDER — CALCIUM CITRATE 150 MG
CAPSULE ORAL
Refills: 0 | Status: ACTIVE | COMMUNITY

## 2023-04-17 RX ORDER — ATORVASTATIN CALCIUM 20 MG/1
20 TABLET, FILM COATED ORAL
Qty: 30 | Refills: 0 | Status: ACTIVE | COMMUNITY
Start: 2023-03-19

## 2023-04-17 RX ORDER — CHROMIUM 200 MCG
TABLET ORAL
Refills: 0 | Status: ACTIVE | COMMUNITY

## 2023-04-17 NOTE — HISTORY OF PRESENT ILLNESS
[FreeTextEntry1] : SUSU IS A 52-YEAR-OLD MALE WHO ON JULY 16, 1970 LAST SEEN DECEMBER 16, 2022 AT WHICH TIME WE GAVE HIM 6 PELLETS INTO THE LEFT SIDE.  WE HAD HIM HOLD COMPRESSION FOR 10 MINUTES BECAUSE HE LOSES A LITTLE BIT.  HE HAD REPEAT BLOODS DONE February 28, he has been told to contact me before he comes in as often the numbers are too high he did not as he figured it was 6 weeks ago so he should be doing.  Please note his last dose was 4 months ago\par \par 1.  In addition, he is not feeling fatigued and with sildenafil at the 100 mg dose he had no trouble performing [Fatigue] : no fatigue

## 2023-04-17 NOTE — ASSESSMENT
[FreeTextEntry1] : He is feeling okay and his numbers have released back on February 28 where to go to given the next dose.  He is probably due but I rather make sure\par \par He will go down the road get blood drawn now and we will put him in for dosing next week if he is doing.\par \par I once again emphasized he needs to contact me before he comes in to make sure that he is due.  The last time we gave him the dose he was borderline and I would have waited but it was going away.  This time I need to make sure that he needs it

## 2023-04-17 NOTE — PHYSICAL EXAM
[General Appearance - Well Nourished] : well nourished [General Appearance - Well Developed] : well developed [Normal Appearance] : normal appearance [Well Groomed] : well groomed [General Appearance - In No Acute Distress] : no acute distress [Abdomen Soft] : soft [Abdomen Tenderness] : non-tender [Costovertebral Angle Tenderness] : no ~M costovertebral angle tenderness [Heart Rate And Rhythm] : Heart rate and rhythm were normal [Edema] : no peripheral edema [Respiration, Rhythm And Depth] : normal respiratory rhythm and effort [] : no respiratory distress [Exaggerated Use Of Accessory Muscles For Inspiration] : no accessory muscle use [Auscultation Breath Sounds / Voice Sounds] : lungs were clear to auscultation bilaterally [Oriented To Time, Place, And Person] : oriented to person, place, and time [Affect] : the affect was normal [Mood] : the mood was normal [Not Anxious] : not anxious [Normal Station and Gait] : the gait and station were normal for the patient's age [No Focal Deficits] : no focal deficits

## 2023-04-17 NOTE — LETTER HEADER
[FreeTextEntry3] : Maria Dolores Francisco M.D.\par Director Emeritus of Urology\par [facility]\par [address] [place] [address], [address]\[place] [address]

## 2023-04-18 LAB — ESTRADIOL SERPL-MCNC: 16 PG/ML

## 2023-04-19 LAB — SHBG SERPL-SCNC: 18.7 NMOL/L

## 2023-04-21 LAB
TESTOSTERONE FREE/WEAKLY BND: 44.7 NG/DL
TESTOSTERONE TOTAL S: 135 NG/DL
TESTOSTERONE, % FREE/WEAKLY BND: 33.1 %

## 2023-05-01 ENCOUNTER — APPOINTMENT (OUTPATIENT)
Dept: UROLOGY | Facility: CLINIC | Age: 53
End: 2023-05-01
Payer: COMMERCIAL

## 2023-05-01 VITALS
WEIGHT: 181 LBS | SYSTOLIC BLOOD PRESSURE: 131 MMHG | BODY MASS INDEX: 26.81 KG/M2 | DIASTOLIC BLOOD PRESSURE: 83 MMHG | HEIGHT: 69 IN | HEART RATE: 56 BPM

## 2023-05-01 DIAGNOSIS — N52.9 MALE ERECTILE DYSFUNCTION, UNSPECIFIED: ICD-10-CM

## 2023-05-01 DIAGNOSIS — S30.0XXA CONTUSION OF LOWER BACK AND PELVIS, INITIAL ENCOUNTER: ICD-10-CM

## 2023-05-01 DIAGNOSIS — Q98.4 KLINEFELTER SYNDROME, UNSPECIFIED: ICD-10-CM

## 2023-05-01 PROCEDURE — 11980 IMPLANT HORMONE PELLET(S): CPT

## 2023-05-01 PROCEDURE — S0189: CPT

## 2023-05-01 PROCEDURE — 99214 OFFICE O/P EST MOD 30 MIN: CPT | Mod: 25

## 2023-05-01 NOTE — HISTORY OF PRESENT ILLNESS
[FreeTextEntry1] : Darwin is a 52-year-old male born July 16, 1970 last seen April 17, 2023.  His blood tests have been done on February 28 and have still been normal.  We had repeat bloods done in April 17 he is here to review and if indicated proceed to his next dose of Testopel.  He has requested higher doses so we can spend more time between pellet insertion so if he is due for medication today we will give him a pellets\par \par Does better with his testosterone on board.  When necessary he will use either sildenafil or tadalafil [Fatigue] : no fatigue

## 2023-05-01 NOTE — ASSESSMENT
[FreeTextEntry1] : He has reached bottom we will give him his next dose.  Last time he got it in the left side this time we will get it into the right and as above I will be giving him a pellets and will check his peak in 2 weeks and see how that goes

## 2023-05-01 NOTE — LETTER HEADER
[FreeTextEntry3] : Maria Dolores Francisco M.D.\par Director Emeritus of Urology\par [facility]\par [address] [place] [address], [address]\[place] [address]

## 2023-09-11 ENCOUNTER — APPOINTMENT (OUTPATIENT)
Dept: UROLOGY | Facility: CLINIC | Age: 53
End: 2023-09-11

## 2023-09-12 ENCOUNTER — LABORATORY RESULT (OUTPATIENT)
Age: 53
End: 2023-09-12

## 2023-09-13 ENCOUNTER — APPOINTMENT (OUTPATIENT)
Dept: ORTHOPEDIC SURGERY | Facility: CLINIC | Age: 53
End: 2023-09-13
Payer: COMMERCIAL

## 2023-09-13 ENCOUNTER — OUTPATIENT (OUTPATIENT)
Dept: OUTPATIENT SERVICES | Facility: HOSPITAL | Age: 53
LOS: 1 days | End: 2023-09-13
Payer: COMMERCIAL

## 2023-09-13 ENCOUNTER — RESULT REVIEW (OUTPATIENT)
Age: 53
End: 2023-09-13

## 2023-09-13 VITALS
DIASTOLIC BLOOD PRESSURE: 72 MMHG | HEART RATE: 59 BPM | BODY MASS INDEX: 26.81 KG/M2 | HEIGHT: 69 IN | WEIGHT: 181 LBS | SYSTOLIC BLOOD PRESSURE: 135 MMHG | OXYGEN SATURATION: 97 %

## 2023-09-13 DIAGNOSIS — S83.206S UNSPECIFIED TEAR OF UNSPECIFIED MENISCUS, CURRENT INJURY, RIGHT KNEE, SEQUELA: ICD-10-CM

## 2023-09-13 DIAGNOSIS — M85.80 OTHER SPECIFIED DISORDERS OF BONE DENSITY AND STRUCTURE, UNSPECIFIED SITE: ICD-10-CM

## 2023-09-13 DIAGNOSIS — S93.491A SPRAIN OF OTHER LIGAMENT OF RIGHT ANKLE, INITIAL ENCOUNTER: ICD-10-CM

## 2023-09-13 PROCEDURE — 73630 X-RAY EXAM OF FOOT: CPT | Mod: 26,50

## 2023-09-13 PROCEDURE — 73610 X-RAY EXAM OF ANKLE: CPT | Mod: 26,RT

## 2023-09-13 PROCEDURE — 99214 OFFICE O/P EST MOD 30 MIN: CPT

## 2023-09-13 PROCEDURE — 73610 X-RAY EXAM OF ANKLE: CPT

## 2023-09-13 PROCEDURE — 73630 X-RAY EXAM OF FOOT: CPT

## 2023-09-13 RX ORDER — MELOXICAM 15 MG/1
15 TABLET ORAL DAILY
Qty: 30 | Refills: 1 | Status: ACTIVE | COMMUNITY
Start: 2023-09-13 | End: 1900-01-01

## 2023-09-20 ENCOUNTER — APPOINTMENT (OUTPATIENT)
Dept: UROLOGY | Facility: CLINIC | Age: 53
End: 2023-09-20
Payer: COMMERCIAL

## 2023-09-20 PROCEDURE — 11980 IMPLANT HORMONE PELLET(S): CPT

## 2023-09-20 PROCEDURE — S0189: CPT

## 2023-09-20 PROCEDURE — 99215 OFFICE O/P EST HI 40 MIN: CPT | Mod: 25

## 2023-09-20 RX ORDER — TADALAFIL 20 MG/1
20 TABLET ORAL
Qty: 6 | Refills: 1 | Status: COMPLETED | OUTPATIENT
Start: 2022-01-14 | End: 2023-09-20

## 2023-09-20 RX ORDER — TESTOSTERONE 16.2 MG/G
20.25 MG/ACT GEL TRANSDERMAL
Qty: 2 | Refills: 0 | Status: COMPLETED | OUTPATIENT
Start: 2021-10-01 | End: 2023-09-20

## 2023-11-01 ENCOUNTER — APPOINTMENT (OUTPATIENT)
Dept: ORTHOPEDIC SURGERY | Facility: CLINIC | Age: 53
End: 2023-11-01

## 2023-11-06 ENCOUNTER — LABORATORY RESULT (OUTPATIENT)
Age: 53
End: 2023-11-06

## 2024-01-29 ENCOUNTER — LABORATORY RESULT (OUTPATIENT)
Age: 54
End: 2024-01-29

## 2024-02-05 ENCOUNTER — APPOINTMENT (OUTPATIENT)
Dept: UROLOGY | Facility: CLINIC | Age: 54
End: 2024-02-05

## 2024-02-09 ENCOUNTER — APPOINTMENT (OUTPATIENT)
Dept: UROLOGY | Facility: CLINIC | Age: 54
End: 2024-02-09
Payer: COMMERCIAL

## 2024-02-09 VITALS
HEART RATE: 54 BPM | DIASTOLIC BLOOD PRESSURE: 79 MMHG | SYSTOLIC BLOOD PRESSURE: 126 MMHG | HEIGHT: 69 IN | RESPIRATION RATE: 18 BRPM | BODY MASS INDEX: 26.66 KG/M2 | OXYGEN SATURATION: 98 % | WEIGHT: 180 LBS

## 2024-02-09 DIAGNOSIS — R79.89 OTHER SPECIFIED ABNORMAL FINDINGS OF BLOOD CHEMISTRY: ICD-10-CM

## 2024-02-09 PROCEDURE — S0189: CPT

## 2024-02-09 PROCEDURE — 99214 OFFICE O/P EST MOD 30 MIN: CPT | Mod: 25

## 2024-02-09 PROCEDURE — 11980 IMPLANT HORMONE PELLET(S): CPT

## 2024-02-09 NOTE — LETTER HEADER
[FreeTextEntry3] : Maria Dolores Francisco MD [address] [place] [address], [address] [place] [id number]

## 2024-02-09 NOTE — ASSESSMENT
[FreeTextEntry1] : His levels are just beginning to drop and he is at 4 months.  His peak was not that high I think we can give him 10 pellets and perhaps get him out another 2 - 4 weeks.  He will get peak in 2 weeks so I can make sure 9 is not too much and he will get trough at 21 weeks unless he begins to feel tired before then.  we will schedule him for follow-up at about 22 weeks.

## 2024-02-09 NOTE — PHYSICAL EXAM
[General Appearance - Well Developed] : well developed [General Appearance - Well Nourished] : well nourished [Normal Appearance] : normal appearance [Well Groomed] : well groomed [General Appearance - In No Acute Distress] : no acute distress [Heart Rate And Rhythm] : heart rate and rhythm were normal [Edema] : no peripheral edema [Respiration, Rhythm And Depth] : normal respiratory rhythm and effort [Exaggerated Use Of Accessory Muscles For Inspiration] : no accessory muscle use [Auscultation Breath Sounds / Voice Sounds] : lungs were clear to auscultation bilaterally [Abdomen Soft] : soft [Abdomen Tenderness] : non-tender [Costovertebral Angle Tenderness] : no ~M costovertebral angle tenderness [Normal Station and Gait] : the gait and station were normal for the patient's age [] : no rash [No Focal Deficits] : no focal deficits [Oriented To Time, Place, And Person] : oriented to person, place, and time [Affect] : the affect was normal [Mood] : the mood was normal [Not Anxious] : not anxious [FreeTextEntry1] : bmi=26.58

## 2024-02-09 NOTE — HISTORY OF PRESENT ILLNESS
[FreeTextEntry1] : Darwin is a 53-year-old male born July 16, 1970 last seen September 20, 2023 at which time he got 8 Testopel pellets.  He got peak bloods November 6, 2023  and trough bloods on January 29, 2024.  He tells me he still feels ok.  He is using sildenafil at the 100 milligram dose and he feels like he is 18 again without side effects.  Send my peak bloods November 6 and tripods on January 29 after 8 pellets on September 20 showed Estradiol went from 31 down to 20 Sex hormone binding globulin was 27.5 and 25.9 Testosterone went from 564 down to 208 ([phone]) Bioavailable testosterone went from 181 down to 45.8 () Hemoglobin was 16.4 with a platelet count of 228,000

## 2024-02-26 LAB
BASOPHILS # BLD AUTO: 0.04 K/UL
BASOPHILS NFR BLD AUTO: 0.6 %
EOSINOPHIL # BLD AUTO: 0.25 K/UL
EOSINOPHIL NFR BLD AUTO: 3.6 %
ESTRADIOL SERPL-MCNC: 28 PG/ML
HCT VFR BLD CALC: 45.6 %
HGB BLD-MCNC: 14.6 G/DL
IMM GRANULOCYTES NFR BLD AUTO: 0.3 %
LYMPHOCYTES # BLD AUTO: 1.5 K/UL
LYMPHOCYTES NFR BLD AUTO: 21.3 %
MAN DIFF?: NORMAL
MCHC RBC-ENTMCNC: 31.7 PG
MCHC RBC-ENTMCNC: 32 G/DL
MCV RBC AUTO: 98.9 FL
MONOCYTES # BLD AUTO: 0.8 K/UL
MONOCYTES NFR BLD AUTO: 11.4 %
NEUTROPHILS # BLD AUTO: 4.43 K/UL
NEUTROPHILS NFR BLD AUTO: 62.8 %
PLATELET # BLD AUTO: 260 K/UL
PMV BLD AUTO: 0 /100 WBCS
PSA FREE FLD-MCNC: 32 %
PSA FREE SERPL-MCNC: 0.44 NG/ML
PSA SERPL-MCNC: 1.39 NG/ML
RBC # BLD: 4.61 M/UL
RBC # FLD: 14.3 %
SHBG SERPL-SCNC: 28 NMOL/L
WBC # FLD AUTO: 7.04 K/UL

## 2024-02-28 LAB
TESTOSTERONE FREE/WEAKLY BND: 405.2 NG/DL
TESTOSTERONE TOTAL S: 846 NG/DL
TESTOSTERONE, % FREE/WEAKLY BND: 47.9 %

## 2024-06-24 LAB — ESTRADIOL SERPL-MCNC: 23 PG/ML

## 2024-06-26 LAB — SHBG SERPL-SCNC: 24.8 NMOL/L

## 2024-06-28 ENCOUNTER — APPOINTMENT (OUTPATIENT)
Dept: UROLOGY | Facility: CLINIC | Age: 54
End: 2024-06-28
Payer: COMMERCIAL

## 2024-06-28 VITALS
OXYGEN SATURATION: 98 % | BODY MASS INDEX: 26.66 KG/M2 | WEIGHT: 180 LBS | HEIGHT: 69 IN | DIASTOLIC BLOOD PRESSURE: 106 MMHG | HEART RATE: 64 BPM | RESPIRATION RATE: 18 BRPM | SYSTOLIC BLOOD PRESSURE: 178 MMHG

## 2024-06-28 VITALS — WEIGHT: 185.4 LBS | BODY MASS INDEX: 27.38 KG/M2

## 2024-06-28 DIAGNOSIS — E29.1 TESTICULAR HYPOFUNCTION: ICD-10-CM

## 2024-06-28 LAB
TESTOSTERONE FREE/WEAKLY BND: 60.4 NG/DL
TESTOSTERONE TOTAL S: 313 NG/DL
TESTOSTERONE, % FREE/WEAKLY BND: 19.3 %

## 2024-06-28 PROCEDURE — 99214 OFFICE O/P EST MOD 30 MIN: CPT

## 2024-06-28 PROCEDURE — G2211 COMPLEX E/M VISIT ADD ON: CPT

## 2024-07-03 ENCOUNTER — APPOINTMENT (OUTPATIENT)
Dept: UROLOGY | Facility: CLINIC | Age: 54
End: 2024-07-03
Payer: COMMERCIAL

## 2024-07-03 VITALS
TEMPERATURE: 98 F | SYSTOLIC BLOOD PRESSURE: 134 MMHG | DIASTOLIC BLOOD PRESSURE: 85 MMHG | HEIGHT: 69 IN | HEART RATE: 55 BPM | WEIGHT: 186 LBS | BODY MASS INDEX: 27.55 KG/M2

## 2024-07-03 DIAGNOSIS — M85.80 OTHER SPECIFIED DISORDERS OF BONE DENSITY AND STRUCTURE, UNSPECIFIED SITE: ICD-10-CM

## 2024-07-03 DIAGNOSIS — N52.9 MALE ERECTILE DYSFUNCTION, UNSPECIFIED: ICD-10-CM

## 2024-07-03 DIAGNOSIS — R79.89 OTHER SPECIFIED ABNORMAL FINDINGS OF BLOOD CHEMISTRY: ICD-10-CM

## 2024-07-03 DIAGNOSIS — Q98.4 KLINEFELTER SYNDROME, UNSPECIFIED: ICD-10-CM

## 2024-07-03 DIAGNOSIS — E29.1 TESTICULAR HYPOFUNCTION: ICD-10-CM

## 2024-07-03 PROCEDURE — S0189: CPT

## 2024-07-03 PROCEDURE — 99406 BEHAV CHNG SMOKING 3-10 MIN: CPT

## 2024-07-03 PROCEDURE — 11980 IMPLANT HORMONE PELLET(S): CPT

## 2024-07-03 PROCEDURE — 99214 OFFICE O/P EST MOD 30 MIN: CPT | Mod: 25

## 2024-07-29 ENCOUNTER — APPOINTMENT (OUTPATIENT)
Dept: UROLOGY | Facility: CLINIC | Age: 54
End: 2024-07-29

## 2024-12-09 LAB
ALBUMIN SERPL ELPH-MCNC: 4.6 G/DL
ALP BLD-CCNC: 99 U/L
ALT SERPL-CCNC: 37 U/L
AST SERPL-CCNC: 26 U/L
BASOPHILS # BLD AUTO: 0.03 K/UL
BASOPHILS NFR BLD AUTO: 0.4 %
BILIRUB DIRECT SERPL-MCNC: 0.3 MG/DL
BILIRUB INDIRECT SERPL-MCNC: 0.6 MG/DL
BILIRUB SERPL-MCNC: 0.9 MG/DL
EOSINOPHIL # BLD AUTO: 0.2 K/UL
EOSINOPHIL NFR BLD AUTO: 2.8 %
ESTRADIOL SERPL-MCNC: 24 PG/ML
HCT VFR BLD CALC: 48.4 %
HGB BLD-MCNC: 16.1 G/DL
IMM GRANULOCYTES NFR BLD AUTO: 0.3 %
LYMPHOCYTES # BLD AUTO: 1.22 K/UL
LYMPHOCYTES NFR BLD AUTO: 16.8 %
MAN DIFF?: NORMAL
MCHC RBC-ENTMCNC: 31.4 PG
MCHC RBC-ENTMCNC: 33.3 G/DL
MCV RBC AUTO: 94.5 FL
MONOCYTES # BLD AUTO: 0.57 K/UL
MONOCYTES NFR BLD AUTO: 7.8 %
NEUTROPHILS # BLD AUTO: 5.23 K/UL
NEUTROPHILS NFR BLD AUTO: 71.9 %
PLATELET # BLD AUTO: 221 K/UL
PMV BLD AUTO: 0 /100 WBCS
PROT SERPL-MCNC: 6.9 G/DL
PSA FREE FLD-MCNC: 21 %
PSA FREE SERPL-MCNC: 0.36 NG/ML
PSA SERPL-MCNC: 1.75 NG/ML
RBC # BLD: 5.12 M/UL
RBC # FLD: 13.1 %
SHBG SERPL-SCNC: 24.8 NMOL/L
WBC # FLD AUTO: 7.27 K/UL

## 2024-12-11 LAB
TESTOSTERONE FREE/WEAKLY BND: 49 NG/DL
TESTOSTERONE TOTAL S: 175 NG/DL
TESTOSTERONE, % FREE/WEAKLY BND: 28 %

## 2024-12-13 ENCOUNTER — APPOINTMENT (OUTPATIENT)
Dept: UROLOGY | Facility: CLINIC | Age: 54
End: 2024-12-13

## 2024-12-13 VITALS
HEART RATE: 61 BPM | SYSTOLIC BLOOD PRESSURE: 138 MMHG | TEMPERATURE: 97.6 F | DIASTOLIC BLOOD PRESSURE: 84 MMHG | HEIGHT: 69 IN | BODY MASS INDEX: 27.99 KG/M2 | WEIGHT: 189 LBS | OXYGEN SATURATION: 98 % | RESPIRATION RATE: 18 BRPM

## 2024-12-13 DIAGNOSIS — N52.9 MALE ERECTILE DYSFUNCTION, UNSPECIFIED: ICD-10-CM

## 2024-12-13 DIAGNOSIS — Q98.4 KLINEFELTER SYNDROME, UNSPECIFIED: ICD-10-CM

## 2024-12-13 DIAGNOSIS — E29.1 TESTICULAR HYPOFUNCTION: ICD-10-CM

## 2024-12-13 PROCEDURE — 99214 OFFICE O/P EST MOD 30 MIN: CPT | Mod: 25

## 2024-12-13 PROCEDURE — 11980 IMPLANT HORMONE PELLET(S): CPT

## 2024-12-13 PROCEDURE — S0189: CPT | Mod: JZ

## 2025-04-25 ENCOUNTER — APPOINTMENT (OUTPATIENT)
Dept: UROLOGY | Facility: CLINIC | Age: 55
End: 2025-04-25

## 2025-04-25 VITALS
BODY MASS INDEX: 27.4 KG/M2 | HEART RATE: 60 BPM | TEMPERATURE: 98.3 F | SYSTOLIC BLOOD PRESSURE: 148 MMHG | HEIGHT: 69 IN | WEIGHT: 185 LBS | DIASTOLIC BLOOD PRESSURE: 87 MMHG

## 2025-04-25 DIAGNOSIS — Q98.4 KLINEFELTER SYNDROME, UNSPECIFIED: ICD-10-CM

## 2025-04-25 DIAGNOSIS — M85.80 OTHER SPECIFIED DISORDERS OF BONE DENSITY AND STRUCTURE, UNSPECIFIED SITE: ICD-10-CM

## 2025-04-25 DIAGNOSIS — E29.1 TESTICULAR HYPOFUNCTION: ICD-10-CM

## 2025-04-25 DIAGNOSIS — N52.9 MALE ERECTILE DYSFUNCTION, UNSPECIFIED: ICD-10-CM

## 2025-04-25 PROCEDURE — 99214 OFFICE O/P EST MOD 30 MIN: CPT

## 2025-04-25 PROCEDURE — G2211 COMPLEX E/M VISIT ADD ON: CPT | Mod: NC

## 2025-05-22 ENCOUNTER — APPOINTMENT (OUTPATIENT)
Dept: UROLOGY | Facility: CLINIC | Age: 55
End: 2025-05-22
Payer: COMMERCIAL

## 2025-05-22 VITALS
RESPIRATION RATE: 16 BRPM | SYSTOLIC BLOOD PRESSURE: 140 MMHG | BODY MASS INDEX: 27.99 KG/M2 | OXYGEN SATURATION: 97 % | DIASTOLIC BLOOD PRESSURE: 82 MMHG | HEART RATE: 58 BPM | HEIGHT: 69 IN | WEIGHT: 189 LBS | TEMPERATURE: 97 F

## 2025-05-22 DIAGNOSIS — Q98.4 KLINEFELTER SYNDROME, UNSPECIFIED: ICD-10-CM

## 2025-05-22 DIAGNOSIS — M85.80 OTHER SPECIFIED DISORDERS OF BONE DENSITY AND STRUCTURE, UNSPECIFIED SITE: ICD-10-CM

## 2025-05-22 DIAGNOSIS — E29.1 TESTICULAR HYPOFUNCTION: ICD-10-CM

## 2025-05-22 DIAGNOSIS — N52.9 MALE ERECTILE DYSFUNCTION, UNSPECIFIED: ICD-10-CM

## 2025-05-22 DIAGNOSIS — N46.01 ORGANIC AZOOSPERMIA: ICD-10-CM

## 2025-05-22 PROCEDURE — 99214 OFFICE O/P EST MOD 30 MIN: CPT | Mod: 25

## 2025-05-22 PROCEDURE — S0189: CPT | Mod: JZ

## 2025-05-22 PROCEDURE — 11980 IMPLANT HORMONE PELLET(S): CPT

## 2025-07-10 ENCOUNTER — NON-APPOINTMENT (OUTPATIENT)
Age: 55
End: 2025-07-10

## 2025-07-11 ENCOUNTER — APPOINTMENT (OUTPATIENT)
Dept: UROLOGY | Facility: CLINIC | Age: 55
End: 2025-07-11